# Patient Record
Sex: FEMALE
[De-identification: names, ages, dates, MRNs, and addresses within clinical notes are randomized per-mention and may not be internally consistent; named-entity substitution may affect disease eponyms.]

---

## 2018-01-26 ENCOUNTER — HOSPITAL ENCOUNTER (EMERGENCY)
Dept: HOSPITAL 31 - C.ER | Age: 33
Discharge: HOME | End: 2018-01-26
Payer: COMMERCIAL

## 2018-01-26 VITALS — RESPIRATION RATE: 16 BRPM | HEART RATE: 88 BPM | SYSTOLIC BLOOD PRESSURE: 134 MMHG | DIASTOLIC BLOOD PRESSURE: 74 MMHG

## 2018-01-26 VITALS — OXYGEN SATURATION: 98 %

## 2018-01-26 VITALS — TEMPERATURE: 98.4 F

## 2018-01-26 DIAGNOSIS — R10.32: Primary | ICD-10-CM

## 2018-01-26 LAB
BACTERIA #/AREA URNS HPF: (no result) /[HPF]
BILIRUB UR-MCNC: NEGATIVE MG/DL
GLUCOSE UR STRIP-MCNC: NORMAL MG/DL
HCG,QUALITATIVE URINE: NEGATIVE
LEUKOCYTE ESTERASE UR-ACNC: (no result) LEU/UL
PH UR STRIP: 6 [PH] (ref 5–8)
PROT UR STRIP-MCNC: NEGATIVE MG/DL
RBC # UR STRIP: NEGATIVE /UL
SP GR UR STRIP: 1.02 (ref 1–1.03)
SQUAMOUS EPITHIAL: 2 /HPF (ref 0–5)
URINE NITRATE: NEGATIVE
UROBILINOGEN UR-MCNC: NORMAL MG/DL (ref 0.2–1)

## 2018-01-26 NOTE — C.PDOC
History Of Present Illness


32 year old female, with PMHx of left ovarian cyst, presents to ED for 

evaluation of intermittent left groin pain/discomfort for the past 6 months. 

Notes that she was taking birth control pills as tx for ovarian cyst, and had 

discontinued use several months ago. Otherwise, denies fever, chills, abd. pain

, n/v/d, back pain, dysuria, or hematuria. 





Time Seen by Provider: 01/26/18 11:37


Chief Complaint (Nursing): Abdominal Pain


History Per: Patient


History/Exam Limitations: no limitations


Onset/Duration Of Symptoms: Days, Intermittent Episodes


Current Symptoms Are (Timing): Still Present


Quality Of Discomfort: "Pain"


Associated Symptoms: denies: Loss Of Appetite, Back Pain, Chest Pain, 

Constipation, Urinary Symptoms


Exacerbating Factors: None


Alleviating Factors: None


Recent travel outside of the [place]: No


Additional History Per: Patient


Abnormal Vaginal Bleeding: No





Past Medical History


Reviewed: Historical Data, Nursing Documentation, Vital Signs


Vital Signs: 


 Last Vital Signs











Temp  98.4 F   01/26/18 15:11


 


Pulse  88   01/26/18 15:11


 


Resp  16   01/26/18 15:11


 


BP  134/74   01/26/18 15:11


 


Pulse Ox  96   01/26/18 15:11











Family History: States: Unknown Family Hx





- Social History


Hx Alcohol Use: Yes


Hx Substance Use: No





- Immunization History


Hx Tetanus Toxoid Vaccination: No


Hx Influenza Vaccination: No


Hx Pneumococcal Vaccination: No





Review Of Systems


Except As Marked, All Systems Reviewed And Found Negative.


Constitutional: Negative for: Fever, Chills


Gastrointestinal: Negative for: Nausea, Vomiting, Diarrhea, Constipation


Genitourinary: Positive for: Pelvic Pain (left groin).  Negative for: Dysuria, 

Frequency, Hematuria, Vaginal Discharge


Musculoskeletal: Negative for: Back Pain


Neurological: Negative for: Headache, Dizziness





Physical Exam





- Physical Exam


Appears: Non-toxic, No Acute Distress


Skin: Normal Color, Warm, Dry


Head: Normacephalic


Eye(s): bilateral: PERRL


Nose: No Discharge


Oral Mucosa: Moist


Throat: No Drooling


Neck: Trachea Midline, Supple


Cardiovascular: Rhythm Regular, No Murmur


Respiratory: No Accessory Muscle Use, No Rales, No Rhonchi, No Wheezing


Gastrointestinal/Abdominal: Soft, No Tenderness, No Distention, No Guarding, No 

Rebound


Back: No CVA Tenderness


Pelvic: Other (mild left groin tenderness)


Extremity: Normal ROM, No Pedal Edema, No Deformity


Neurological/Psych: Oriented x3, Normal Speech





ED Course And Treatment





- Laboratory Results


Urine Pregnancy POC: Negative


O2 Sat by Pulse Oximetry: 98 (RA)


Pulse Ox Interpretation: Normal





- CT Scan/US


  ** TRANSVAGINAL


Other Rad Studies (CT/US): Interpreted By Me, Radiology Report Reviewed


CT/US Interpretation: IMPRESSION:  Unremarkable pelvic ultrasound as above.


Progress Note: UA, transvaginal ultrasound ordered and reviewed.  On re-eval, 

pt is afebrile, hemodynamicaly stable.  Non-toxic.  Ambulatory in ED with 

stable gait.  Afebrile, hemodynamicaly stable.  Non-toxic.  Abd: benign.  back: 

(-) CVA tenderness.  UA results review and appears normal.  Preg (-).  

Transvaginal US- normal study, prelim exam.  Pt advised.  ref. to f/u with GYN 

in2 -3 days for re-eavl.





Disposition


Counseled Patient/Family Regarding: Studies Performed, Diagnosis, Need For 

Followup





- Disposition


[facility]errals[facility] 


[facility]


Disposition: HOME/ ROUTINE


Disposition Time: 13:30


Condition: STABLE


Additional Instructions: 


FOLLOW UP WITH GYN IN 2-3 DAYS FOR RE-EVALUATION AND FURTHER TREATMENT AS NEED





RETURN TO ED IF ANY WORSENING OR NEW CHANGES.


Prescriptions: 


Ibuprofen [Motrin Tab] 600 mg PO Q6 #20 tab


Instructions:  Groin Pain (ED)


Forms:  [facility] (English)





- Clinical Impression


Clinical Impression: 


 Groin pain








- PA / NP / Resident Statement


MD/DO has reviewed & agrees with the documentation as recorded.





- Scribe Statement


The provider has reviewed the documentation as recorded by the Ana Luisa Kelley





All medical record entries made by the Ana Luisa were at my direction and 

personally dictated by me. I have reviewed the chart and agree that the record 

accurately reflects my personal performance of the history, physical exam, 

medical decision making, and the department course for this patient. I have 

also personally directed, reviewed, and agree with the discharge instructions 

and disposition.

## 2018-01-26 NOTE — US
HISTORY:

Left groin pain



COMPARISON:

None available



TECHNIQUE:

 Real-time transabdominal pelvic ultrasound was performed. In 

addition a transvaginal pelvic ultrasound was necessary to better 

depict pelvic anatomy. 



FINDINGS:



UTERUS:

Measures 12.3 x 5.5 x 6.4 cm. Anteverted.



ENDOMETRIUM:

Measures 1.2 cm in diameter.  



CERVIX:

No cervical abnormality identified.



RIGHT OVARY:

Measures 3.3 x 2.4 x 3.5 cm. Blood flow is demonstrated. 



LEFT OVARY:

Measures 3.7 x 2.2 x 3.7 cm. Blood flow is demonstrated.



FREE FLUID:

No significant free fluid noted.



OTHER FINDINGS:

Limited submitted images were obtained in the region of interest 

(left groin) without discrete abnormality appreciated. 



IMPRESSION:

Unremarkable pelvic ultrasound as above.